# Patient Record
Sex: MALE | Race: WHITE | URBAN - METROPOLITAN AREA
[De-identification: names, ages, dates, MRNs, and addresses within clinical notes are randomized per-mention and may not be internally consistent; named-entity substitution may affect disease eponyms.]

---

## 2024-08-19 ENCOUNTER — HOSPITAL ENCOUNTER (EMERGENCY)
Age: 54
Discharge: HOME | End: 2024-08-19
Payer: COMMERCIAL

## 2024-08-19 VITALS
HEART RATE: 117 BPM | HEIGHT: 74 IN | SYSTOLIC BLOOD PRESSURE: 170 MMHG | BODY MASS INDEX: 36.57 KG/M2 | RESPIRATION RATE: 18 BRPM | WEIGHT: 285 LBS | DIASTOLIC BLOOD PRESSURE: 125 MMHG | OXYGEN SATURATION: 94 % | TEMPERATURE: 98.2 F

## 2024-08-19 DIAGNOSIS — T63.481A ALLERGIC REACTION TO HYMENOPTERA VENOM: Primary | ICD-10-CM

## 2024-08-19 PROCEDURE — 99283 EMERGENCY DEPT VISIT LOW MDM: CPT

## 2024-08-19 PROCEDURE — 6370000000 HC RX 637 (ALT 250 FOR IP): Performed by: NURSE PRACTITIONER

## 2024-08-19 RX ORDER — PREDNISONE 20 MG/1
40 TABLET ORAL ONCE
Status: COMPLETED | OUTPATIENT
Start: 2024-08-19 | End: 2024-08-19

## 2024-08-19 RX ORDER — FAMOTIDINE 20 MG/1
40 TABLET, FILM COATED ORAL ONCE
Status: COMPLETED | OUTPATIENT
Start: 2024-08-19 | End: 2024-08-19

## 2024-08-19 RX ORDER — PREDNISONE 10 MG/1
TABLET ORAL
Qty: 20 TABLET | Refills: 0 | Status: SHIPPED | OUTPATIENT
Start: 2024-08-19 | End: 2024-08-29

## 2024-08-19 RX ADMIN — PREDNISONE 40 MG: 20 TABLET ORAL at 14:26

## 2024-08-19 RX ADMIN — FAMOTIDINE 40 MG: 20 TABLET, FILM COATED ORAL at 14:26

## 2024-08-19 ASSESSMENT — PAIN SCALES - GENERAL: PAINLEVEL_OUTOF10: 2

## 2024-08-19 ASSESSMENT — PAIN DESCRIPTION - PAIN TYPE: TYPE: ACUTE PAIN

## 2024-08-19 ASSESSMENT — PAIN - FUNCTIONAL ASSESSMENT: PAIN_FUNCTIONAL_ASSESSMENT: 0-10

## 2024-08-19 NOTE — ED PROVIDER NOTES
radiologist. Plain radiographic images are visualized and preliminarily interpreted by the ED Provider with the below findings:        Interpretation per the Radiologist below, if available at the time of this note:    No orders to display     No results found.    No results found.    PROCEDURES   Unless otherwise noted below, none     Procedures    CRITICAL CARE TIME (.cctime)       PAST MEDICAL HISTORY      has no past medical history on file.     EMERGENCY DEPARTMENT COURSE and DIFFERENTIAL DIAGNOSIS/MDM:   Vitals:    Vitals:    08/19/24 1401   BP: (!) 170/125   Pulse: (!) 117   Resp: 18   Temp: 98.2 °F (36.8 °C)   TempSrc: Oral   SpO2: 94%   Weight: 129.3 kg (285 lb)   Height: 1.88 m (6' 2\")       Patient was given the following medications:  Medications   predniSONE (DELTASONE) tablet 40 mg (40 mg Oral Given 8/19/24 1426)   famotidine (PEPCID) tablet 40 mg (40 mg Oral Given 8/19/24 1426)             Is this patient to be included in the SEP-1 core measure? No   Exclusion criteria - the patient is NOT to be included for SEP-1 Core Measure due to:  Infection is not suspected     Chronic Conditions affecting care:    has no past medical history on file.    CONSULTS: (Who and What was discussed)  None      Social Determinants Significantly Affecting Health : None    Records Reviewed (External and Source)     CC/HPI Summary, DDx, ED Course, and Reassessment: Patient presented to the ER today with complaints of getting stung in the back of his neck by a bee, states that he started to have hives and was concerned about worsening allergic reaction.  He had taken Benadryl prior to coming in, on my initial examination he had no rash and his exam was reassuring.  I did give him some steroids, antihistamines, he was monitored in the ED with no worsening symptoms.  Patient started on steroids as an outpatient.  He was discharged in good condition          I am the Primary Clinician of Record.  FINAL IMPRESSION      1.